# Patient Record
Sex: FEMALE | Race: WHITE | Employment: FULL TIME | URBAN - METROPOLITAN AREA
[De-identification: names, ages, dates, MRNs, and addresses within clinical notes are randomized per-mention and may not be internally consistent; named-entity substitution may affect disease eponyms.]

---

## 2021-08-21 ENCOUNTER — HOSPITAL ENCOUNTER (EMERGENCY)
Age: 41
Discharge: HOME | End: 2021-08-21
Payer: COMMERCIAL

## 2021-08-21 VITALS
TEMPERATURE: 98.2 F | BODY MASS INDEX: 26.52 KG/M2 | OXYGEN SATURATION: 100 % | WEIGHT: 165 LBS | HEIGHT: 66 IN | HEART RATE: 86 BPM | DIASTOLIC BLOOD PRESSURE: 68 MMHG | SYSTOLIC BLOOD PRESSURE: 120 MMHG | RESPIRATION RATE: 19 BRPM

## 2021-08-21 DIAGNOSIS — L25.5 PLANT DERMATITIS: Primary | ICD-10-CM

## 2021-08-21 PROCEDURE — 99283 EMERGENCY DEPT VISIT LOW MDM: CPT

## 2021-08-21 RX ORDER — TRIAMCINOLONE ACETONIDE 1 MG/G
CREAM TOPICAL
Qty: 1 TUBE | Refills: 0 | Status: SHIPPED | OUTPATIENT
Start: 2021-08-21

## 2021-08-21 NOTE — ED NOTES
Patient present to the ED with poss poison Ivy on both arms and hands patient has visible rash on both arms      Jane Salter  08/21/21 1024

## 2021-08-21 NOTE — ED PROVIDER NOTES
eMERGENCY dEPARTMENT eNCOUnter        [address] [facility]    Chief Complaint   Patient presents with    Rash     poison ivy       HPI    Oksana Pallas is a 36 y.o. female who presents with a rash. Onset was 2 days ago. The duration has been constant. The quality is pruritic. The location is bilateral forearms and hands, ears. Started after cleaning yard. No one else has rash. No other new chemicals or exposures. Associated symptoms: none. Aggravating factors: none. REVIEW OF SYSTEMS    Pulmonary: No difficulty breathing or chest tightness  Skin: + rash  ENT: No throat swelling or tongue swelling  General: No fevers or syncope    PAST MEDICAL & SURGICAL HISTORY    Past Medical History:   Diagnosis Date    GERD (gastroesophageal reflux disease)      Past Surgical History:   Procedure Laterality Date    KIDNEY SURGERY      TUBAL LIGATION      VEIN SURGERY         CURRENT MEDICATIONS    Current Outpatient Rx   Medication Sig Dispense Refill    triamcinolone (KENALOG) 0.1 % cream Apply topically 2 times daily for 10 days. 1 Tube 0       ALLERGIES    No Known Allergies    SOCIAL & FAMILY HISTORY    Social History     Socioeconomic History    Marital status:      Spouse name: None    Number of children: None    Years of education: None    Highest education level: None   Occupational History    None   Tobacco Use    Smoking status: Never Smoker    Smokeless tobacco: Never Used   Substance and Sexual Activity    Alcohol use: Never    Drug use: Never    Sexual activity: None   Other Topics Concern    None   Social History Narrative    None     Social Determinants of Health     Financial Resource Strain:     Difficulty of Paying Living Expenses:    Food Insecurity:     Worried About Running Out of Food in the Last Year:     Ran Out of Food in the Last Year:    Transportation Needs:     Lack of Transportation (Medical):      Lack of Transportation (Non-Medical):    Physical Activity:  Days of Exercise per Week:     Minutes of Exercise per Session:    Stress:     Feeling of Stress :    Social Connections:     Frequency of Communication with Friends and Family:     Frequency of Social Gatherings with Friends and Family:     Attends Rastafari Services:     Active Member of Clubs or Organizations:     Attends Club or Organization Meetings:     Marital Status:    Intimate Partner Violence:     Fear of Current or Ex-Partner:     Emotionally Abused:     Physically Abused:     Sexually Abused:      History reviewed. No pertinent family history. PHYSICAL EXAM    VITAL SIGNS: /68   Pulse 86   Temp 98.2 °F (36.8 °C) (Oral)   Resp 19   Ht 5' 6\" (1.676 m)   Wt 165 lb (74.8 kg)   LMP 08/06/2021   SpO2 100%   BMI 26.63 kg/m²   Constitutional:  Well developed, well nourished, no acute distress  HENT:  Atraumatic, no facial or lip swelling  ORAL EXAM:  No tongue swelling, airway patent  NECK:  Supple, No neck swelling  Respiratory:  No respiratory distress  Cardiovascular:  No JVD   GI: nondistended, normal bowel sounds, nontender, no organomegaly   Musculoskeletal:  No edema, no acute deformities. Integument: Patient has erythematous clear vesicular rash noted on the bilateral forearms and hands and small patches. There is also a few small lesions over the posterior right pinna and on the antihelix of the left ear. There are no petechiae, pustules, purpura, induration, desquamation, bullae or discharge. ED COURSE & MEDICAL DECISION MAKING    Pertinent Labs reviewed and interpreted. (See chart for details)  See chart for details of medications given during the ED stay. Vitals:    08/21/21 1022   BP: 120/68   Pulse: 86   Resp: 19   Temp: 98.2 °F (36.8 °C)   TempSrc: Oral   SpO2: 100%   Weight: 165 lb (74.8 kg)   Height: 5' 6\" (1.676 m)     Patient has a rash consistent with plant-based dermatitis.   It is fairly localized, should respond well to topical steroids, will start on triamcinolone. Recommended avoiding using this on the face or genitals. Recommended Benadryl for itching. Follow-up with health resource center in 2 to 3 days if not improving for recheck. Return to ED for any new or worsening symptoms. I have independently evaluated this patient. Differential diagnosis: Vasculitis, bacterial skin infection, viral rash, systemic infectious rash, Anaphylaxis, Urticaria, other    FINAL IMPRESSION    Rash          (Please note that this note was completed with a voice recognition program.  Every attempt was made to edit the dictations, but inevitably there remain words that are mis-transcribed. Tanna Colindres PA-C  08/21/21 1118

## 2021-08-21 NOTE — ED NOTES
Discharge instructions reviewed with pt and questions addressed at this time. Pt alert and oriented x 4 at time of discharge, no signs of acute distress noted. Pt ambulatory to Emergency Department waiting room and steady gait noted.         Minh Augustin RN  08/21/21 [id number]